# Patient Record
Sex: FEMALE | Race: WHITE | Employment: OTHER | URBAN - METROPOLITAN AREA
[De-identification: names, ages, dates, MRNs, and addresses within clinical notes are randomized per-mention and may not be internally consistent; named-entity substitution may affect disease eponyms.]

---

## 2017-05-09 ENCOUNTER — TRANSFERRED RECORDS (OUTPATIENT)
Dept: HEALTH INFORMATION MANAGEMENT | Facility: CLINIC | Age: 67
End: 2017-05-09

## 2017-05-10 ENCOUNTER — TRANSFERRED RECORDS (OUTPATIENT)
Dept: HEALTH INFORMATION MANAGEMENT | Facility: CLINIC | Age: 67
End: 2017-05-10

## 2017-05-15 ENCOUNTER — HOSPITAL ENCOUNTER (OUTPATIENT)
Facility: CLINIC | Age: 67
Discharge: HOME | End: 2017-05-15
Attending: RADIOLOGY | Admitting: RADIOLOGY
Payer: MEDICARE

## 2017-05-15 ENCOUNTER — APPOINTMENT (OUTPATIENT)
Dept: INTERVENTIONAL RADIOLOGY/VASCULAR | Facility: CLINIC | Age: 67
End: 2017-05-15
Attending: INTERNAL MEDICINE
Payer: MEDICARE

## 2017-05-15 VITALS
SYSTOLIC BLOOD PRESSURE: 130 MMHG | OXYGEN SATURATION: 93 % | DIASTOLIC BLOOD PRESSURE: 69 MMHG | HEART RATE: 71 BPM | TEMPERATURE: 97.3 F | RESPIRATION RATE: 14 BRPM

## 2017-05-15 DIAGNOSIS — C34.90 NON-SMALL CELL LUNG CANCER (NSCLC) (H): ICD-10-CM

## 2017-05-15 PROCEDURE — C1769 GUIDE WIRE: HCPCS

## 2017-05-15 PROCEDURE — 25000128 H RX IP 250 OP 636

## 2017-05-15 PROCEDURE — 27210904 ZZH KIT CR6

## 2017-05-15 PROCEDURE — 27210908 ZZH NEEDLE CR4

## 2017-05-15 PROCEDURE — 36561 INSERT TUNNELED CV CATH: CPT

## 2017-05-15 PROCEDURE — 99153 MOD SED SAME PHYS/QHP EA: CPT

## 2017-05-15 PROCEDURE — 27210820 ZZH WOUND GLUE CR3

## 2017-05-15 PROCEDURE — 25000125 ZZHC RX 250: Performed by: RADIOLOGY

## 2017-05-15 PROCEDURE — 76937 US GUIDE VASCULAR ACCESS: CPT

## 2017-05-15 PROCEDURE — 77001 FLUOROGUIDE FOR VEIN DEVICE: CPT

## 2017-05-15 PROCEDURE — 99152 MOD SED SAME PHYS/QHP 5/>YRS: CPT

## 2017-05-15 PROCEDURE — 25000128 H RX IP 250 OP 636: Performed by: RADIOLOGY

## 2017-05-15 PROCEDURE — C1788 PORT, INDWELLING, IMP: HCPCS

## 2017-05-15 RX ORDER — FENTANYL CITRATE 50 UG/ML
INJECTION, SOLUTION INTRAMUSCULAR; INTRAVENOUS
Status: DISCONTINUED
Start: 2017-05-15 | End: 2017-05-15 | Stop reason: HOSPADM

## 2017-05-15 RX ORDER — OXYCODONE HYDROCHLORIDE 10 MG/1
10 TABLET ORAL 4 TIMES DAILY
Status: ON HOLD | COMMUNITY
End: 2022-01-01

## 2017-05-15 RX ORDER — PROMETHAZINE HYDROCHLORIDE 25 MG/1
TABLET ORAL EVERY 6 HOURS PRN
COMMUNITY

## 2017-05-15 RX ORDER — FLUMAZENIL 0.1 MG/ML
0.2 INJECTION, SOLUTION INTRAVENOUS
Status: DISCONTINUED | OUTPATIENT
Start: 2017-05-15 | End: 2017-05-15 | Stop reason: HOSPADM

## 2017-05-15 RX ORDER — CEFAZOLIN SODIUM 2 G/100ML
2 INJECTION, SOLUTION INTRAVENOUS
Status: COMPLETED | OUTPATIENT
Start: 2017-05-15 | End: 2017-05-15

## 2017-05-15 RX ORDER — LIDOCAINE 40 MG/G
CREAM TOPICAL
Status: DISCONTINUED | OUTPATIENT
Start: 2017-05-15 | End: 2017-05-15 | Stop reason: HOSPADM

## 2017-05-15 RX ORDER — HEPARIN SODIUM 1000 [USP'U]/ML
INJECTION, SOLUTION INTRAVENOUS; SUBCUTANEOUS
Status: DISCONTINUED
Start: 2017-05-15 | End: 2017-05-15 | Stop reason: HOSPADM

## 2017-05-15 RX ORDER — CEFAZOLIN SODIUM 2 G/100ML
INJECTION, SOLUTION INTRAVENOUS
Status: DISCONTINUED
Start: 2017-05-15 | End: 2017-05-15 | Stop reason: HOSPADM

## 2017-05-15 RX ORDER — FENTANYL CITRATE 50 UG/ML
25-50 INJECTION, SOLUTION INTRAMUSCULAR; INTRAVENOUS EVERY 5 MIN PRN
Status: DISCONTINUED | OUTPATIENT
Start: 2017-05-15 | End: 2017-05-15 | Stop reason: HOSPADM

## 2017-05-15 RX ORDER — NALOXONE HYDROCHLORIDE 0.4 MG/ML
.1-.4 INJECTION, SOLUTION INTRAMUSCULAR; INTRAVENOUS; SUBCUTANEOUS
Status: DISCONTINUED | OUTPATIENT
Start: 2017-05-15 | End: 2017-05-15 | Stop reason: HOSPADM

## 2017-05-15 RX ADMIN — FENTANYL CITRATE 50 MCG: 50 INJECTION INTRAMUSCULAR; INTRAVENOUS at 09:19

## 2017-05-15 RX ADMIN — FENTANYL CITRATE 50 MCG: 50 INJECTION INTRAMUSCULAR; INTRAVENOUS at 09:12

## 2017-05-15 RX ADMIN — SODIUM CHLORIDE: 9 INJECTION, SOLUTION INTRAVENOUS at 09:22

## 2017-05-15 RX ADMIN — MIDAZOLAM HYDROCHLORIDE 1 MG: 1 INJECTION, SOLUTION INTRAMUSCULAR; INTRAVENOUS at 09:12

## 2017-05-15 RX ADMIN — LIDOCAINE HYDROCHLORIDE 12 ML: 10 INJECTION, SOLUTION EPIDURAL; INFILTRATION; INTRACAUDAL; PERINEURAL at 09:14

## 2017-05-15 RX ADMIN — CEFAZOLIN SODIUM 2 G: 2 INJECTION, SOLUTION INTRAVENOUS at 09:05

## 2017-05-15 RX ADMIN — MIDAZOLAM HYDROCHLORIDE 1 MG: 1 INJECTION, SOLUTION INTRAMUSCULAR; INTRAVENOUS at 09:19

## 2017-05-15 NOTE — IP AVS SNAPSHOT
[facility]    [address] [facility] [address]    Phone:  [phone]                                       After Visit Summary   5/15/2017    Aimee Cabrera    MRN: [id number]           After Visit Summary Signature Page     I have received my discharge instructions, and my questions have been answered. I have discussed any challenges I see with this plan with the nurse or doctor.    ..........................................................................................................................................  Patient/Patient Representative Signature      ..........................................................................................................................................  Patient Representative Print Name and Relationship to Patient    ..................................................               ................................................  Date                                            Time    ..........................................................................................................................................  Reviewed by Signature/Title    ...................................................              ..............................................  Date                                                            Time

## 2017-05-15 NOTE — IP AVS SNAPSHOT
MRN:[id number]                      After Visit Summary   5/15/2017    Aimee Cabrera    MRN: [id number]           Visit Information        Department      5/15/2017  8:11 AM [facility]          Review of your medicines      UNREVIEWED medicines. Ask your doctor about these medicines        Dose / Directions    OXYCODONE HCL PO   Indication:  Chronic Pain        Dose:  5 mg   Take 5 mg by mouth every 4 hours as needed   Refills:  0       promethazine 25 MG tablet   Commonly known as:  PHENERGAN   Indication:  Nausea and Vomiting        Take by mouth every 6 hours as needed for nausea   Refills:  0                Protect others around you: Learn how to safely use, store and throw away your medicines at www.disposemymeds.org.         Follow-ups after your visit         Care Instructions        Further instructions from your care team         Going Home after Your Port Is Inserted  _______________________________________    Patient Name: Aimee Cabrera  Today's Date: May 15, 2017    The doctor who inserted your port was:  Dr. Parsons at [facility]     Have your port site and/or neck wound checked on:  Next clinic appointment      Your port may be accessed right away. A nurse needs to flush your port every 30 days or after each use.     When you get home:    You should have an adult with you for the first 6 hours.    No driving or drinking alcohol until tomorrow. You may still have side effects from the medicine you received today (you may feel drowsy, unsteady or forgetful).     You may go back to your regular diet today.     If you take aspirin or Plavix, you may begin taking it again tomorrow. You may restart all other medicines today. Use pain medicine as directed.    Avoid heavy lifting or the overuse of your shoulder for three days.    Caring for the port site and/or neck wound:    Keep the port site clean and dry. Cover the site with plastic before taking a shower. Do  "this until the site has healed.     Keep the bandage on your port site for three days. Change it if it gets wet or dirty. After three days, you may use Band-Aids until the wound has healed.    For a neck wound, leave the tape on for three days. You may cover it with a Band-Aid for comfort.     If you have oozing or bleeding from the port site or from the cut in your neck:     Put direct pressure on the wound for 5 to 10 minutes with a gauze pad.  If you still have bleeding after 10 minutes, call your doctor.    If you are bleeding a lot and can't control it with direct pressure, call 911.    Call your doctor at  Oncology clinic if you have:    Bleeding from a wound after 10 minutes of direct pressure.    Swelling in your neck or over your port site.    Signs of infection: a fever over 100 F (37.8 C) under the tongue; the site is red, tender or draining.       Additional Information About Your Visit        [employer] Information     [employer] lets you send messages to your doctor, view your test results, renew your prescriptions, schedule appointments and more. To sign up, go to [email or web address] . Click on \"Log in\" on the left side of the screen, which will take you to the Welcome page. Then click on \"Sign up Now\" on the right side of the page.     You will be asked to enter the access code listed below, as well as some personal information. Please follow the directions to create your username and password.     Your access code is: [email or web address]  Expires: 2017  9:26 AM     Your access code will  in 90 days. If you need help or a new code, please call your [facility] clinic or [phone].        Care EveryWhere ID     This is your Care EveryWhere ID. This could be used by other organizations to access your [facility] medical records  [id number]        Your Vitals Were     Blood Pressure Pulse Temperature Respirations Pulse Oximetry       138/70 68 97.3  F (36.3  C) (Temporal) 14 94%        Primary Care " Provider Office Phone # Fax #    Elizabeth Gore [phone]      Thank you!     Thank you for choosing [facility] for your care. Our goal is always to provide you with excellent care. Hearing back from our patients is one way we can continue to improve our services. Please take a few minutes to complete the written survey that you may receive in the mail after you visit. If you would like to speak to someone directly about your visit please contact Patient Relations at [phone]. Thank you!               Medication List: This is a list of all your medications and when to take them. Check marks below indicate your daily home schedule. Keep this list as a reference.      Medications           Morning Afternoon Evening Bedtime As Needed    OXYCODONE HCL PO   Take 5 mg by mouth every 4 hours as needed                                promethazine 25 MG tablet   Commonly known as:  PHENERGAN   Take by mouth every 6 hours as needed for nausea

## 2017-05-15 NOTE — DISCHARGE INSTRUCTIONS
Going Home after Your Port Is Inserted  _______________________________________    Patient Name: Aimee Cabrera  Today's Date: May 15, 2017    The doctor who inserted your port was:  Dr. Parsons at [facility]     Have your port site and/or neck wound checked on:  Next clinic appointment      Your port may be accessed right away. A nurse needs to flush your port every 30 days or after each use.     When you get home:    You should have an adult with you for the first 6 hours.    No driving or drinking alcohol until tomorrow. You may still have side effects from the medicine you received today (you may feel drowsy, unsteady or forgetful).     You may go back to your regular diet today.     If you take aspirin or Plavix, you may begin taking it again tomorrow. You may restart all other medicines today. Use pain medicine as directed.    Avoid heavy lifting or the overuse of your shoulder for three days.    Caring for the port site and/or neck wound:    Keep the port site clean and dry. Cover the site with plastic before taking a shower. Do this until the site has healed.     Keep the bandage on your port site for three days. Change it if it gets wet or dirty. After three days, you may use Band-Aids until the wound has healed.    For a neck wound, leave the tape on for three days. You may cover it with a Band-Aid for comfort.     If you have oozing or bleeding from the port site or from the cut in your neck:     Put direct pressure on the wound for 5 to 10 minutes with a gauze pad.  If you still have bleeding after 10 minutes, call your doctor.    If you are bleeding a lot and can't control it with direct pressure, call 911.    Call your doctor at  Oncology clinic if you have:    Bleeding from a wound after 10 minutes of direct pressure.    Swelling in your neck or over your port site.    Signs of infection: a fever over 100 F (37.8 C) under the tongue; the site is red, tender or draining.

## 2022-01-01 ENCOUNTER — HOSPITAL ENCOUNTER (OUTPATIENT)
Facility: CLINIC | Age: 72
Setting detail: OBSERVATION
Discharge: NURSING, REHAB OR LONG-TERM CARE | End: 2022-03-06
Attending: HOSPITALIST | Admitting: HOSPITALIST
Payer: COMMERCIAL

## 2022-01-01 ENCOUNTER — TELEPHONE (OUTPATIENT)
Dept: FAMILY MEDICINE | Facility: CLINIC | Age: 72
End: 2022-01-01
Payer: COMMERCIAL

## 2022-01-01 ENCOUNTER — HOSPITAL ENCOUNTER (OUTPATIENT)
Dept: CARDIOLOGY | Facility: CLINIC | Age: 72
Discharge: HOME | End: 2022-03-08
Attending: INTERNAL MEDICINE | Admitting: INTERNAL MEDICINE
Payer: COMMERCIAL

## 2022-01-01 ENCOUNTER — TRANSFERRED RECORDS (OUTPATIENT)
Dept: HEALTH INFORMATION MANAGEMENT | Facility: CLINIC | Age: 72
End: 2022-01-01
Payer: COMMERCIAL

## 2022-01-01 ENCOUNTER — TRANSFERRED RECORDS (OUTPATIENT)
Dept: HEALTH INFORMATION MANAGEMENT | Facility: CLINIC | Age: 72
End: 2022-01-01

## 2022-01-01 VITALS
TEMPERATURE: 98.1 F | HEIGHT: 69 IN | DIASTOLIC BLOOD PRESSURE: 72 MMHG | BODY MASS INDEX: 17.52 KG/M2 | HEART RATE: 86 BPM | WEIGHT: 118.3 LBS | OXYGEN SATURATION: 95 % | SYSTOLIC BLOOD PRESSURE: 129 MMHG | RESPIRATION RATE: 16 BRPM

## 2022-01-01 DIAGNOSIS — R10.9 FLANK PAIN: Primary | ICD-10-CM

## 2022-01-01 DIAGNOSIS — R11.2 NAUSEA AND VOMITING, INTRACTABILITY OF VOMITING NOT SPECIFIED, UNSPECIFIED VOMITING TYPE: ICD-10-CM

## 2022-01-01 DIAGNOSIS — C34.90 NON-SMALL CELL LUNG CANCER (H): Primary | ICD-10-CM

## 2022-01-01 DIAGNOSIS — C34.90 NON-SMALL CELL LUNG CANCER (H): ICD-10-CM

## 2022-01-01 DIAGNOSIS — R63.6 UNDERWEIGHT: ICD-10-CM

## 2022-01-01 LAB
ANION GAP SERPL CALCULATED.3IONS-SCNC: 6 MMOL/L (ref 3–14)
ANION GAP SERPL CALCULATED.3IONS-SCNC: 6 MMOL/L (ref 3–14)
ATRIAL RATE - MUSE: 96 BPM
BASOPHILS # BLD AUTO: 0.1 10E3/UL (ref 0–0.2)
BASOPHILS NFR BLD AUTO: 1 %
BUN SERPL-MCNC: 10 MG/DL (ref 7–30)
BUN SERPL-MCNC: 16 MG/DL (ref 7–30)
CALCIUM SERPL-MCNC: 8.1 MG/DL (ref 8.5–10.1)
CALCIUM SERPL-MCNC: 8.6 MG/DL (ref 8.5–10.1)
CHLORIDE BLD-SCNC: 103 MMOL/L (ref 94–109)
CHLORIDE BLD-SCNC: 106 MMOL/L (ref 94–109)
CO2 SERPL-SCNC: 26 MMOL/L (ref 20–32)
CO2 SERPL-SCNC: 27 MMOL/L (ref 20–32)
CREAT SERPL-MCNC: 0.58 MG/DL (ref 0.52–1.04)
CREAT SERPL-MCNC: 0.63 MG/DL (ref 0.52–1.04)
CREATININE (EXTERNAL): 0.86 MG/DL (ref 0.5–1.2)
DIASTOLIC BLOOD PRESSURE - MUSE: NORMAL MMHG
EOSINOPHIL # BLD AUTO: 0.4 10E3/UL (ref 0–0.7)
EOSINOPHIL NFR BLD AUTO: 6 %
ERYTHROCYTE [DISTWIDTH] IN BLOOD BY AUTOMATED COUNT: 14 % (ref 10–15)
ERYTHROCYTE [DISTWIDTH] IN BLOOD BY AUTOMATED COUNT: 14.5 % (ref 10–15)
GFR ESTIMATED (EXTERNAL): 72.2 ML/MIN/1.73M2
GFR SERPL CREATININE-BSD FRML MDRD: >90 ML/MIN/1.73M2
GFR SERPL CREATININE-BSD FRML MDRD: >90 ML/MIN/1.73M2
GLUCOSE (EXTERNAL): 107 MG/DL (ref 73–126)
GLUCOSE BLD-MCNC: 86 MG/DL (ref 70–99)
GLUCOSE BLD-MCNC: 88 MG/DL (ref 70–99)
HCT VFR BLD AUTO: 36 % (ref 35–47)
HCT VFR BLD AUTO: 37.6 % (ref 35–47)
HGB BLD-MCNC: 11.4 G/DL (ref 11.7–15.7)
HGB BLD-MCNC: 12.3 G/DL (ref 11.7–15.7)
IMM GRANULOCYTES # BLD: 0 10E3/UL
IMM GRANULOCYTES NFR BLD: 0 %
INTERPRETATION ECG - MUSE: NORMAL
LYMPHOCYTES # BLD AUTO: 2.2 10E3/UL (ref 0.8–5.3)
LYMPHOCYTES NFR BLD AUTO: 28 %
MCH RBC QN AUTO: 29.2 PG (ref 26.5–33)
MCH RBC QN AUTO: 29.9 PG (ref 26.5–33)
MCHC RBC AUTO-ENTMCNC: 31.7 G/DL (ref 31.5–36.5)
MCHC RBC AUTO-ENTMCNC: 32.7 G/DL (ref 31.5–36.5)
MCV RBC AUTO: 92 FL (ref 78–100)
MCV RBC AUTO: 92 FL (ref 78–100)
MONOCYTES # BLD AUTO: 0.8 10E3/UL (ref 0–1.3)
MONOCYTES NFR BLD AUTO: 11 %
NEUTROPHILS # BLD AUTO: 4.3 10E3/UL (ref 1.6–8.3)
NEUTROPHILS NFR BLD AUTO: 54 %
NRBC # BLD AUTO: 0 10E3/UL
NRBC BLD AUTO-RTO: 0 /100
P AXIS - MUSE: 85 DEGREES
PLATELET # BLD AUTO: 241 10E3/UL (ref 150–450)
PLATELET # BLD AUTO: 242 10E3/UL (ref 150–450)
POTASSIUM (EXTERNAL): 4 MMOL/L (ref 3.5–5.1)
POTASSIUM BLD-SCNC: 3.8 MMOL/L (ref 3.4–5.3)
POTASSIUM BLD-SCNC: 4.5 MMOL/L (ref 3.4–5.3)
PR INTERVAL - MUSE: 142 MS
QRS DURATION - MUSE: 82 MS
QT - MUSE: 368 MS
QTC - MUSE: 464 MS
R AXIS - MUSE: 78 DEGREES
RBC # BLD AUTO: 3.91 10E6/UL (ref 3.8–5.2)
RBC # BLD AUTO: 4.11 10E6/UL (ref 3.8–5.2)
SARS-COV-2 RNA RESP QL NAA+PROBE: NEGATIVE
SODIUM SERPL-SCNC: 136 MMOL/L (ref 133–144)
SODIUM SERPL-SCNC: 138 MMOL/L (ref 133–144)
SYSTOLIC BLOOD PRESSURE - MUSE: NORMAL MMHG
T AXIS - MUSE: 80 DEGREES
VENTRICULAR RATE- MUSE: 96 BPM
WBC # BLD AUTO: 7.8 10E3/UL (ref 4–11)
WBC # BLD AUTO: 7.9 10E3/UL (ref 4–11)

## 2022-01-01 PROCEDURE — 80048 BASIC METABOLIC PNL TOTAL CA: CPT | Performed by: INTERNAL MEDICINE

## 2022-01-01 PROCEDURE — 99207 PR CDG-HISTORY COMP: MEETS EXP. PROB FOCUSED- DOWN CODED LACK OF PFSH: CPT | Performed by: PHYSICIAN ASSISTANT

## 2022-01-01 PROCEDURE — 87635 SARS-COV-2 COVID-19 AMP PRB: CPT | Performed by: PHYSICIAN ASSISTANT

## 2022-01-01 PROCEDURE — 250N000011 HC RX IP 250 OP 636: Performed by: PHYSICIAN ASSISTANT

## 2022-01-01 PROCEDURE — 96376 TX/PRO/DX INJ SAME DRUG ADON: CPT

## 2022-01-01 PROCEDURE — 99218 PR INITIAL OBSERVATION CARE,LEVEL I: CPT | Performed by: PHYSICIAN ASSISTANT

## 2022-01-01 PROCEDURE — 250N000009 HC RX 250: Performed by: PHYSICIAN ASSISTANT

## 2022-01-01 PROCEDURE — 93005 ELECTROCARDIOGRAM TRACING: CPT

## 2022-01-01 PROCEDURE — 250N000013 HC RX MED GY IP 250 OP 250 PS 637: Performed by: INTERNAL MEDICINE

## 2022-01-01 PROCEDURE — 80048 BASIC METABOLIC PNL TOTAL CA: CPT | Performed by: PHYSICIAN ASSISTANT

## 2022-01-01 PROCEDURE — 99225 PR SUBSEQUENT OBSERVATION CARE,LEVEL II: CPT | Performed by: INTERNAL MEDICINE

## 2022-01-01 PROCEDURE — 99207 PR CDG-CODE CATEGORY CHANGED: CPT | Performed by: INTERNAL MEDICINE

## 2022-01-01 PROCEDURE — 96361 HYDRATE IV INFUSION ADD-ON: CPT

## 2022-01-01 PROCEDURE — 85025 COMPLETE CBC W/AUTO DIFF WBC: CPT | Performed by: PHYSICIAN ASSISTANT

## 2022-01-01 PROCEDURE — G0378 HOSPITAL OBSERVATION PER HR: HCPCS

## 2022-01-01 PROCEDURE — 96375 TX/PRO/DX INJ NEW DRUG ADDON: CPT

## 2022-01-01 PROCEDURE — 85027 COMPLETE CBC AUTOMATED: CPT | Performed by: INTERNAL MEDICINE

## 2022-01-01 PROCEDURE — 96374 THER/PROPH/DIAG INJ IV PUSH: CPT

## 2022-01-01 PROCEDURE — 250N000011 HC RX IP 250 OP 636: Performed by: INTERNAL MEDICINE

## 2022-01-01 PROCEDURE — 250N000013 HC RX MED GY IP 250 OP 250 PS 637: Performed by: PHYSICIAN ASSISTANT

## 2022-01-01 PROCEDURE — 99217 PR OBSERVATION CARE DISCHARGE: CPT | Performed by: INTERNAL MEDICINE

## 2022-01-01 PROCEDURE — 258N000003 HC RX IP 258 OP 636: Performed by: PHYSICIAN ASSISTANT

## 2022-01-01 RX ORDER — NALOXONE HYDROCHLORIDE 0.4 MG/ML
0.2 INJECTION, SOLUTION INTRAMUSCULAR; INTRAVENOUS; SUBCUTANEOUS
Status: DISCONTINUED | OUTPATIENT
Start: 2022-01-01 | End: 2022-01-01 | Stop reason: HOSPADM

## 2022-01-01 RX ORDER — PROMETHAZINE HYDROCHLORIDE 25 MG/1
25 TABLET ORAL EVERY 6 HOURS PRN
Status: DISCONTINUED | OUTPATIENT
Start: 2022-01-01 | End: 2022-01-01 | Stop reason: HOSPADM

## 2022-01-01 RX ORDER — ESZOPICLONE 1 MG/1
2 TABLET, FILM COATED ORAL AT BEDTIME
Status: DISCONTINUED | OUTPATIENT
Start: 2022-01-01 | End: 2022-01-01 | Stop reason: HOSPADM

## 2022-01-01 RX ORDER — HYDROMORPHONE HCL IN WATER/PF 6 MG/30 ML
0.2 PATIENT CONTROLLED ANALGESIA SYRINGE INTRAVENOUS
Status: DISCONTINUED | OUTPATIENT
Start: 2022-01-01 | End: 2022-01-01 | Stop reason: HOSPADM

## 2022-01-01 RX ORDER — HYDROXYZINE HYDROCHLORIDE 50 MG/1
50 TABLET, FILM COATED ORAL EVERY 6 HOURS PRN
Status: DISCONTINUED | OUTPATIENT
Start: 2022-01-01 | End: 2022-01-01 | Stop reason: HOSPADM

## 2022-01-01 RX ORDER — OXYCODONE HYDROCHLORIDE 5 MG/1
10 TABLET ORAL EVERY 4 HOURS PRN
Status: DISCONTINUED | OUTPATIENT
Start: 2022-01-01 | End: 2022-01-01

## 2022-01-01 RX ORDER — HYDROMORPHONE HYDROCHLORIDE 4 MG/1
4 TABLET ORAL
Qty: 24 TABLET | Refills: 0 | Status: SHIPPED | OUTPATIENT
Start: 2022-01-01

## 2022-01-01 RX ORDER — AMOXICILLIN 250 MG
2 CAPSULE ORAL 2 TIMES DAILY PRN
Status: DISCONTINUED | OUTPATIENT
Start: 2022-01-01 | End: 2022-01-01 | Stop reason: HOSPADM

## 2022-01-01 RX ORDER — ACETAMINOPHEN 650 MG/1
650 SUPPOSITORY RECTAL EVERY 6 HOURS PRN
Status: DISCONTINUED | OUTPATIENT
Start: 2022-01-01 | End: 2022-01-01 | Stop reason: HOSPADM

## 2022-01-01 RX ORDER — HYDROXYZINE HYDROCHLORIDE 25 MG/1
25 TABLET, FILM COATED ORAL EVERY 6 HOURS PRN
Status: DISCONTINUED | OUTPATIENT
Start: 2022-01-01 | End: 2022-01-01 | Stop reason: HOSPADM

## 2022-01-01 RX ORDER — MIRTAZAPINE 15 MG/1
15 TABLET, FILM COATED ORAL AT BEDTIME
Status: DISCONTINUED | OUTPATIENT
Start: 2022-01-01 | End: 2022-01-01 | Stop reason: HOSPADM

## 2022-01-01 RX ORDER — MIRTAZAPINE 15 MG/1
15 TABLET, FILM COATED ORAL AT BEDTIME
Qty: 30 TABLET | Refills: 0 | Status: SHIPPED | OUTPATIENT
Start: 2022-01-01 | End: 2022-01-01

## 2022-01-01 RX ORDER — HEPARIN SODIUM,PORCINE 10 UNIT/ML
5-10 VIAL (ML) INTRAVENOUS EVERY 24 HOURS
Status: DISCONTINUED | OUTPATIENT
Start: 2022-01-01 | End: 2022-01-01 | Stop reason: HOSPADM

## 2022-01-01 RX ORDER — POLYETHYLENE GLYCOL 3350 17 G/17G
17 POWDER, FOR SOLUTION ORAL DAILY PRN
Status: DISCONTINUED | OUTPATIENT
Start: 2022-01-01 | End: 2022-01-01 | Stop reason: HOSPADM

## 2022-01-01 RX ORDER — ESZOPICLONE 2 MG/1
2 TABLET, FILM COATED ORAL AT BEDTIME
Qty: 30 TABLET | Refills: 0 | Status: SHIPPED | OUTPATIENT
Start: 2022-01-01

## 2022-01-01 RX ORDER — SCOLOPAMINE TRANSDERMAL SYSTEM 1 MG/1
1 PATCH, EXTENDED RELEASE TRANSDERMAL
Status: DISCONTINUED | OUTPATIENT
Start: 2022-01-01 | End: 2022-01-01 | Stop reason: HOSPADM

## 2022-01-01 RX ORDER — NALOXONE HYDROCHLORIDE 0.4 MG/ML
0.4 INJECTION, SOLUTION INTRAMUSCULAR; INTRAVENOUS; SUBCUTANEOUS
Status: DISCONTINUED | OUTPATIENT
Start: 2022-01-01 | End: 2022-01-01 | Stop reason: HOSPADM

## 2022-01-01 RX ORDER — LIDOCAINE 40 MG/G
CREAM TOPICAL
Status: DISCONTINUED | OUTPATIENT
Start: 2022-01-01 | End: 2022-01-01 | Stop reason: HOSPADM

## 2022-01-01 RX ORDER — LORAZEPAM 2 MG/ML
.5-1 INJECTION INTRAMUSCULAR EVERY 4 HOURS PRN
Status: DISCONTINUED | OUTPATIENT
Start: 2022-01-01 | End: 2022-01-01 | Stop reason: HOSPADM

## 2022-01-01 RX ORDER — HEPARIN SODIUM (PORCINE) LOCK FLUSH IV SOLN 100 UNIT/ML 100 UNIT/ML
5-10 SOLUTION INTRAVENOUS
Status: DISCONTINUED | OUTPATIENT
Start: 2022-01-01 | End: 2022-01-01 | Stop reason: HOSPADM

## 2022-01-01 RX ORDER — MIRTAZAPINE 15 MG/1
15 TABLET, FILM COATED ORAL AT BEDTIME
Qty: 30 TABLET | Refills: 0 | Status: SHIPPED | OUTPATIENT
Start: 2022-01-01

## 2022-01-01 RX ORDER — OXYCODONE HYDROCHLORIDE 5 MG/1
5 TABLET ORAL EVERY 4 HOURS PRN
Status: DISCONTINUED | OUTPATIENT
Start: 2022-01-01 | End: 2022-01-01

## 2022-01-01 RX ORDER — HYDROMORPHONE HYDROCHLORIDE 2 MG/1
4 TABLET ORAL
Status: DISCONTINUED | OUTPATIENT
Start: 2022-01-01 | End: 2022-01-01 | Stop reason: HOSPADM

## 2022-01-01 RX ORDER — SODIUM CHLORIDE 9 MG/ML
INJECTION, SOLUTION INTRAVENOUS CONTINUOUS
Status: ACTIVE | OUTPATIENT
Start: 2022-01-01 | End: 2022-01-01

## 2022-01-01 RX ORDER — HYDROXYZINE HYDROCHLORIDE 25 MG/1
25 TABLET, FILM COATED ORAL EVERY 6 HOURS PRN
Qty: 30 TABLET | Refills: 0 | Status: SHIPPED | OUTPATIENT
Start: 2022-01-01 | End: 2022-01-01

## 2022-01-01 RX ORDER — SCOLOPAMINE TRANSDERMAL SYSTEM 1 MG/1
1 PATCH, EXTENDED RELEASE TRANSDERMAL
Qty: 3 PATCH | Refills: 0 | Status: SHIPPED | OUTPATIENT
Start: 2022-01-01

## 2022-01-01 RX ORDER — ESZOPICLONE 3 MG/1
3 TABLET, FILM COATED ORAL AT BEDTIME
Status: ON HOLD | COMMUNITY
End: 2022-01-01

## 2022-01-01 RX ORDER — SCOLOPAMINE TRANSDERMAL SYSTEM 1 MG/1
1 PATCH, EXTENDED RELEASE TRANSDERMAL
Qty: 3 PATCH | Refills: 0 | Status: SHIPPED | OUTPATIENT
Start: 2022-01-01 | End: 2022-01-01

## 2022-01-01 RX ORDER — AMOXICILLIN 250 MG
1 CAPSULE ORAL 2 TIMES DAILY PRN
Status: DISCONTINUED | OUTPATIENT
Start: 2022-01-01 | End: 2022-01-01 | Stop reason: HOSPADM

## 2022-01-01 RX ORDER — ACETAMINOPHEN 325 MG/1
650 TABLET ORAL EVERY 6 HOURS PRN
Status: DISCONTINUED | OUTPATIENT
Start: 2022-01-01 | End: 2022-01-01 | Stop reason: HOSPADM

## 2022-01-01 RX ORDER — HEPARIN SODIUM,PORCINE 10 UNIT/ML
5-10 VIAL (ML) INTRAVENOUS
Status: DISCONTINUED | OUTPATIENT
Start: 2022-01-01 | End: 2022-01-01 | Stop reason: HOSPADM

## 2022-01-01 RX ORDER — HYDROXYZINE HYDROCHLORIDE 25 MG/1
25 TABLET, FILM COATED ORAL EVERY 6 HOURS PRN
Qty: 30 TABLET | Refills: 0 | Status: SHIPPED | OUTPATIENT
Start: 2022-01-01

## 2022-01-01 RX ADMIN — HYDROMORPHONE HYDROCHLORIDE 4 MG: 2 TABLET ORAL at 11:55

## 2022-01-01 RX ADMIN — SERTRALINE HYDROCHLORIDE 50 MG: 50 TABLET ORAL at 08:04

## 2022-01-01 RX ADMIN — SODIUM CHLORIDE: 9 INJECTION, SOLUTION INTRAVENOUS at 15:50

## 2022-01-01 RX ADMIN — HYDROMORPHONE HYDROCHLORIDE 0.2 MG: 0.2 INJECTION, SOLUTION INTRAMUSCULAR; INTRAVENOUS; SUBCUTANEOUS at 19:37

## 2022-01-01 RX ADMIN — SCOPALAMINE 1 PATCH: 1 PATCH, EXTENDED RELEASE TRANSDERMAL at 15:57

## 2022-01-01 RX ADMIN — LORAZEPAM 0.5 MG: 2 INJECTION INTRAMUSCULAR; INTRAVENOUS at 15:57

## 2022-01-01 RX ADMIN — HYDROMORPHONE HYDROCHLORIDE 4 MG: 2 TABLET ORAL at 18:07

## 2022-01-01 RX ADMIN — SERTRALINE HYDROCHLORIDE 50 MG: 50 TABLET ORAL at 09:45

## 2022-01-01 RX ADMIN — HYDROMORPHONE HYDROCHLORIDE 0.2 MG: 0.2 INJECTION, SOLUTION INTRAMUSCULAR; INTRAVENOUS; SUBCUTANEOUS at 22:43

## 2022-01-01 RX ADMIN — HEPARIN SODIUM (PORCINE) LOCK FLUSH IV SOLN 100 UNIT/ML 5 ML: 100 SOLUTION at 10:45

## 2022-01-01 RX ADMIN — ESZOPICLONE 2 MG: 1 TABLET, FILM COATED ORAL at 21:39

## 2022-01-01 RX ADMIN — LORAZEPAM 0.5 MG: 2 INJECTION INTRAMUSCULAR; INTRAVENOUS at 12:02

## 2022-01-01 RX ADMIN — HYDROMORPHONE HYDROCHLORIDE 4 MG: 2 TABLET ORAL at 21:42

## 2022-01-01 RX ADMIN — HYDROXYZINE HYDROCHLORIDE 25 MG: 25 TABLET, FILM COATED ORAL at 20:24

## 2022-01-01 RX ADMIN — HYDROMORPHONE HYDROCHLORIDE 0.2 MG: 0.2 INJECTION, SOLUTION INTRAMUSCULAR; INTRAVENOUS; SUBCUTANEOUS at 02:01

## 2022-01-01 RX ADMIN — HYDROMORPHONE HYDROCHLORIDE 4 MG: 2 TABLET ORAL at 08:04

## 2022-01-01 RX ADMIN — HYDROMORPHONE HYDROCHLORIDE 0.2 MG: 0.2 INJECTION, SOLUTION INTRAMUSCULAR; INTRAVENOUS; SUBCUTANEOUS at 05:06

## 2022-01-01 RX ADMIN — HYDROMORPHONE HYDROCHLORIDE 0.2 MG: 0.2 INJECTION, SOLUTION INTRAMUSCULAR; INTRAVENOUS; SUBCUTANEOUS at 08:15

## 2022-01-01 RX ADMIN — HYDROMORPHONE HYDROCHLORIDE 4 MG: 2 TABLET ORAL at 15:23

## 2022-01-01 RX ADMIN — HYDROMORPHONE HYDROCHLORIDE 0.2 MG: 0.2 INJECTION, SOLUTION INTRAMUSCULAR; INTRAVENOUS; SUBCUTANEOUS at 15:50

## 2022-03-04 PROBLEM — R10.9 FLANK PAIN: Status: ACTIVE | Noted: 2022-01-01

## 2022-03-04 PROBLEM — R11.2 NAUSEA WITH VOMITING: Status: ACTIVE | Noted: 2022-01-01

## 2022-03-04 NOTE — TELEPHONE ENCOUNTER
3-Hospitalist Huddle Documentation    Acuity/Preferred Bed Type: medical floor  Infection Concerns: none  Additional Specialist Needed Or Specialist Already Contacted:   Oncology  Timely Treatments Needed: No  Important things to know/address during hospitalization: sitter not needed and none      Clinic: [facility]  Provider: Dr. Massey  Dx: Lymphoma      Significant pain in left flank from adrenal metastasis and recent biopsy. Appears very weak, nauseas and not keeping anything down.     Requesting fluids and pain control.     No fever, mild tachycardia,   Counts look OK per oncology    Last CT scan on 2/14 with mets.   Consider reimaging and consult oncology

## 2022-03-04 NOTE — PHARMACY-ADMISSION MEDICATION HISTORY
.Admission medication history interview status for this patient is complete. See [facility] admission navigator for allergy information, prior to admission medications and immunization status.     Medication history interview done, indicate source(s): Patient  Medication history resources (including written lists, pill bottles, clinic record):None  Pharmacy: [id number]  [place] - [address]    Changes made to PTA medication list:  Added: Lunesta 3mg, Zoloft 50mg  Changed: Oxycodone 5mg -> 10mg QID  Reported as Not Taking: none  Removed: none    Actions taken by pharmacist (provider contacted, etc): Called [facility] for doses     Additional medication history information:None    Medication reconciliation/reorder completed by provider prior to medication history?  No       Prior to Admission medications    Medication Sig Last Dose Taking? Auth Provider   eszopiclone (LUNESTA) 3 MG tablet Take 3 mg by mouth At Bedtime 3/2/2022 Yes Unknown, Entered By History   oxyCODONE IR (ROXICODONE) 10 MG tablet Take 10 mg by mouth 4 times daily  3/2/2022 Yes Reported, Patient   promethazine (PHENERGAN) 25 MG tablet Take by mouth every 6 hours as needed for nausea 3/2/2022 Yes Reported, Patient   sertraline (ZOLOFT) 50 MG tablet Take 50 mg by mouth daily 3/2/2022 Yes Unknown, Entered By History

## 2022-03-04 NOTE — H&P
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     1. LUQ abd pain, N/V  Hx of stage IV non small cell lung ca, treated with chemo radiation ~5 yrs ago. Hx is a bit unclear, was started on Keytruda ~1 yr ago due to, I believe, enlarging adrenal mass. Again, without records, exact time line is unclear. For the past month, has had increasing LUQ abd/flank pain with nausea and vomiting and poor appetite. Denies fever, chills, cough, chest pain, SOB or diarrhea. S/p biopsy of the left adrenal gland a few days ago. Dr. Massey from [facility] requested direct admit for pain control and symptom management.  -admit to OBS  -will request records as there is no recent data in our chart  -consult oncology  -IVFs  -antiemetics - phenergan PRN and scopolamine patch and PRN IV ativan  -pain control with Tylenol, oxycodone, hydroxyzine and IV dilaudid  -planning for immunotherapy and maybe Keytruda once functional status improves  -hold off on further imaging or labs today until records are obtained.     Asymptomatic Covid test       Diet:   regular diet as tolerated  DVT Prophylaxis: Pneumatic Compression Devices  Segovia Catheter: Not present  Central Lines: PRESENT     Cardiac Monitoring: None  Code Status:   full code    Clinically Significant Risk Factors Present on Admission                     Disposition Plan   Expected Discharge:  1-2 days   Anticipated discharge location:  Awaiting care coordination huddle  Delays:     none         The patient's care was discussed with the Attending Physician, Dr. Al, Bedside Nurse and Patient.    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 She was dx with stage IV non small cell lung ca ~5 yrs ago. She was treated with chemo and radiation. She had, I believe, an adrenal mass at the time of her dx. She was started on Keytruda maybe 1 yr ago and things had remained stable until recently. Her adrenal mass has been enlarging. She has increased LUQ abd/flank pain for the past several months, as well as nausea, vomiting and poor appetite. She did have a biopsy a few days ago but denies significant change in sx since that time. She had follow up with Dr. Massey in clinic today who requested direct admit for symptom management. She denies fever, chills, cough, chest pain, SOB, diarrhea or dysuria. Per pt, she is supposed to start immunotherapy plus Ketruda once her functional status improves.     Review of Systems    The 10 point Review of Systems is negative other than noted in the HPI or here.     Past Medical History    I have reviewed this patient's medical history and updated it with pertinent information if needed.   Non small cell lung ca    Past Surgical History   I have reviewed this patient's surgical history and updated it with pertinent information if needed.  SHANNON, BSO    Social History   I have reviewed this patient's social history and updated it with pertinent information if needed.  Social History     Tobacco Use     Smoking status: Not on file     Smokeless tobacco: Not on file   Substance Use Topics     Alcohol use: Not on file      Drug use: Not on file   hx of tobacco use    Family History     Mother - lymphoma, CAD    Prior to Admission Medications   Prior to Admission Medications   Prescriptions Last Dose Informant Patient Reported? Taking?   OXYCODONE HCL PO   Yes No   Sig: Take 5 mg by mouth every 4 hours as needed   promethazine (PHENERGAN) 25 MG tablet   Yes No   Sig: Take by mouth every 6 hours as needed for nausea      Facility-Administered Medications: None     Allergies   Allergies   Allergen Reactions     Zofran [Ondansetron] Other (See Comments)       Physical Exam   Vital Signs: Temp: 97.8  F (36.6  C) Temp src: Oral BP: (!) 154/78 Pulse: 91   Resp: 16 SpO2: 99 % O2 Device: None (Room air)    Weight: 118 lbs 4.8 oz    GENERAL:  Comfortable.  PSYCH: pleasant, oriented, No acute distress.  HEENT:  Atraumatic, normocephalic. Normal conjunctiva, normal hearing, and oropharynx is normal.  NECK:  Supple, no neck vein distention  HEART:  Normal S1, S2 with no murmur, no pericardial rub, gallops or S3 or S4.  LUNGS:  Clear to auscultation, normal Respiratory effort. No wheezing, rales or ronchi.  GI:  Soft, normal bowel sounds. LUQ abd tenderness, non distended.   EXTREMITIES:  No pedal edema, +2 pulses bilateral and equal.  SKIN:  Dry to touch, No rash, wound or ulcerations.  NEUROLOGIC:  CN 2-12 intact, BL 5/5 symmetric upper and lower extremity strength, sensation is intact with no focal deficits.      Data   Data reviewed today: I reviewed all medications, new labs and imaging results over the last 24 hours. I personally reviewed no images or EKG's available for review.

## 2022-03-05 NOTE — PROGRESS NOTES
PRIMARY DIAGNOSIS: N/V, Pain  OUTPATIENT/OBSERVATION GOALS TO BE MET BEFORE DISCHARGE:  1. ADLs back to baseline: Yes    2. Activity and level of assistance: Up with standby assistance.    3. Pain status: Improved-controlled with oral pain medications.    4. Return to near baseline physical activity: Yes     Discharge Planner Nurse   Safe discharge environment identified: Yes  Barriers to discharge: Yes       Entered by: Sean Perez 03/05/2022 3:59 PM   pt up with assist, pain controlled with PO medications, pt calls appropriately, pt continues to have poor PO intake, VSS, no acute changes.   Please review provider order for any additional goals.   Nurse to notify provider when observation goals have been met and patient is ready for discharge.

## 2022-03-05 NOTE — PLAN OF CARE
"PRIMARY DIAGNOSIS: Flank Pain, N/V  OUTPATIENT/OBSERVATION GOALS TO BE MET BEFORE DISCHARGE:  1. ADLs back to baseline: Yes    2. Activity and level of assistance: Up with standby assistance.    3. Pain status: Improved but still requiring IV narcotics.    4. Return to near baseline physical activity: Yes     Discharge Planner Nurse   Safe discharge environment identified: Yes  Barriers to discharge: Yes       Entered by: Dary Garrett 03/05/2022 12:08 AM    /79 (BP Location: Right arm)   Pulse 85   Temp 98.2  F (36.8  C) (Oral)   Resp 16   Ht 1.753 m (5' 9\")   Wt 53.7 kg (118 lb 4.8 oz)   SpO2 96%   BMI 17.47 kg/m     Pt rates pain 7/10, IV dilaudid given for relief. Pt is A&O. On a regular diet, poor intake. Pt denies nausea. Has a port infusing NS at 100 mL/hr. Hematology/oncology following. Up SBA d/t medication use, but is steady on feet. Will continue to monitor and provide supportive measures.   Please review provider order for any additional goals.   Nurse to notify provider when observation goals have been met and patient is ready for discharge.  "

## 2022-03-05 NOTE — PROGRESS NOTES
PRIMARY DIAGNOSIS: N/V, Pain   OUTPATIENT/OBSERVATION GOALS TO BE MET BEFORE DISCHARGE:  1. ADLs back to baseline: Yes    2. Activity and level of assistance: Up with standby assistance.    3. Pain status: Improved-controlled with oral pain medications.    4. Return to near baseline physical activity: Yes     Discharge Planner Nurse   Safe discharge environment identified: Yes  Barriers to discharge: Yes       Entered by: Sean Perez 03/05/2022 12:28 PM      Pt given PO medications for pain, pt nauseated with small emesis, IV ativan given x1.   Please review provider order for any additional goals.   Nurse to notify provider when observation goals have been met and patient is ready for discharge.

## 2022-03-05 NOTE — PROGRESS NOTES
PRIMARY DIAGNOSIS: N/V, Pain  OUTPATIENT/OBSERVATION GOALS TO BE MET BEFORE DISCHARGE:  1. ADLs back to baseline: Yes    2. Activity and level of assistance: Up with standby assistance.    3. Pain status: Improved but still requiring IV narcotics.    4. Return to near baseline physical activity: Yes     Discharge Planner Nurse   Safe discharge environment identified: Yes  Barriers to discharge: Yes       Entered by: Sean Perez 03/05/2022 12:27 PM     Please review provider order for any additional goals.   Nurse to notify provider when observation goals have been met and patient is ready for discharge.

## 2022-03-05 NOTE — PLAN OF CARE
Goal Outcome Evaluation:        PRIMARY DIAGNOSIS: N/V    OUTPATIENT/OBSERVATION GOALS TO BE MET BEFORE DISCHARGE  1. Orthostatic performed: N/A    2. Tolerating PO fluid and/or antibiotics (if applicable): Yes    3. Nausea/Vomiting/Diarrhea symptoms improved: Yes with antiemetics    4. Pain status: Improved but still requiring IV narcotics.    5. Return to near baseline physical activity: Yes    Discharge Planner Nurse   Safe discharge environment identified: Yes  Barriers to discharge: No       Entered by: Morenita Phillips 03/04/2022 6:32 PM     Please review provider order for any additional goals.   Nurse to notify provider when observation goals have been met and patient is ready for discharge.    Pt was a direct admit at 1330 from oncology. Pt is a/o, up SBA. VSS. Pain in left flank area- controlled with dilaudid. Nausea- ativan given. Appetite is poor. NS running at 100/hr. Plan is for pain control and discharge back home.

## 2022-03-05 NOTE — PLAN OF CARE
"PRIMARY DIAGNOSIS: Flank Pain, N/V  OUTPATIENT/OBSERVATION GOALS TO BE MET BEFORE DISCHARGE:  1. ADLs back to baseline: Yes    2. Activity and level of assistance: Up with standby assistance.    3. Pain status: Improved but still requiring IV narcotics.    4. Return to near baseline physical activity: Yes     Discharge Planner Nurse   Safe discharge environment identified: Yes  Barriers to discharge: Yes       Entered by: Dary Garrett 03/05/2022 4:32 AM    /77 (BP Location: Right arm)   Pulse 95   Temp 98  F (36.7  C) (Oral)   Resp 18   Ht 1.753 m (5' 9\")   Wt 53.7 kg (118 lb 4.8 oz)   SpO2 96%   BMI 17.47 kg/m     Pt rates pain 7/10, IV dilaudid given for relief. Pt is A&O. On a regular diet, poor intake. Pt denies nausea. Has a port infusing NS at 100 mL/hr. Hematology/oncology following. Up SBA d/t medication use, but is steady on feet. Will continue to monitor and provide supportive measures.   Please review provider order for any additional goals.   Nurse to notify provider when observation goals have been met and patient is ready for discharge.  "

## 2022-03-05 NOTE — PLAN OF CARE
"PRIMARY DIAGNOSIS: Flank Pain, N/V  OUTPATIENT/OBSERVATION GOALS TO BE MET BEFORE DISCHARGE:  1. ADLs back to baseline: Yes    2. Activity and level of assistance: Up with standby assistance.    3. Pain status: Improved but still requiring IV narcotics.    4. Return to near baseline physical activity: Yes     Discharge Planner Nurse   Safe discharge environment identified: Yes  Barriers to discharge: Yes       Entered by: Dary Garrett 03/04/2022 7:51 PM    /79 (BP Location: Right arm)   Pulse 85   Temp 98.2  F (36.8  C) (Oral)   Resp 16   Ht 1.753 m (5' 9\")   Wt 53.7 kg (118 lb 4.8 oz)   SpO2 96%   BMI 17.47 kg/m     Pt rates pain 7/10, IV dilaudid given for relief. Pt is A&O. On a regular diet, poor intake. Pt denies nausea. Has a port infusing NS at 100 mL/hr. Hematology/oncology following. Up SBA d/t medication use, but is steady on feet. Will continue to monitor and provide supportive measures.   Please review provider order for any additional goals.   Nurse to notify provider when observation goals have been met and patient is ready for discharge.  "

## 2022-03-05 NOTE — PROGRESS NOTES
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  The patient had been battling chronic LUQ abdominal pain and nausea and vomiting for the past several months.  It has not changed in location but has become more intense.  She was recently found to have a mass on her adrenal glands and a biopsy was obtained.  She currently follows with Dr. Massey of oncology and has been on Keytruda infusions.  In the emergency department patient found of a temperature of 97.8  F, heart rate 91, blood pressure 154/78, respiratory rate 16, SPO2 99% on room air.  Lab work revealed a hemoglobin of 11.4.  The patient was admitted to observation for pain control and symptom control.    TODAY'S PLAN:  Pt with some persistent abdominal pain overnight.  Had some nausea this AM.  Ate dinner.  Last BM was yesterday and described as loose.  No change in location of abd pain over last few months, just more intense.  N/V over last month as well.  States oxycodone makes her nauseas.  Will change to PO dilaudid 4 mg q3h prn.  Continue ativan/compazine for nausea prn.  Resume home lunesta.  Appreciate Oncology recommendations.  Will continue symptom control today.  Anticipate discharge home in the morning.      Problem List:   Acute on Chronic Intractable LUQ Abdominal Pain, Nausea, and Vomiting  - Recent rx for oxycodone 10 mg q4h prn.  Pt states it helped the pain a bit but made her quite nauseas  - Start Hydromorphone 4 mg PO q4h prn  - Compazine prn  - Continue to titrate pain meds and antiemetics    Metastatic Non-Small Cell Lung Ca  - Recent biopsy of adrenal gland  - Follows  with Dr. Massey of Oncology  - Appreciate Oncology recommendations  - Keytruda per Oncology    Underweight  - BMI 17  - Encourage PO intake    Chronic Medical Problems:  COPD  Hx of Nicotine Dependence with Cigarettes    DVT Prophylaxis: Pneumatic Compression Devices  Code Status: Full Code  Diet: Regular Diet Adult    Segovia Catheter: Not present  Disposition: Expected discharge tomorrow to home. Goals prior to discharge include symptoms improving.   Family updated today: No     Interval History   Pt seen and examined.  Pt admits to some LUQ abd pain/flank pain.  Has some nausea as well.  Ate most of dinner last night and a small breakfast.  Last BM was yesterday and described as loose.     -Data reviewed today: I personally reviewed all new labs and imaging results over the last 24 hours.     Physical Exam   Temp: 97.8  F (36.6  C) Temp src: Oral BP: (!) 153/81 Pulse: 92   Resp: 18 SpO2: 96 % O2 Device: None (Room air)    Vitals:    03/04/22 1339   Weight: 53.7 kg (118 lb 4.8 oz)     Vital Signs with Ranges  Temp:  [97.8  F (36.6  C)-98.2  F (36.8  C)] 97.8  F (36.6  C)  Pulse:  [85-95] 92  Resp:  [16-18] 18  BP: (129-154)/(77-88) 153/81  SpO2:  [96 %-99 %] 96 %  I/O last 3 completed shifts:  In: 300 [P.O.:300]  Out: -     GENERAL: No apparent distress. Awake, alert, and fully oriented.  HEENT: Normocephalic, atraumatic. Extraocular movements intact.  CARDIOVASCULAR: Regular rate and rhythm without murmurs or rubs. No S3.  PULMONARY: Clear bilaterally.  GASTROINTESTINAL: Soft, non-tender, non-distended. Bowel sounds normoactive.   EXTREMITIES: No cyanosis or clubbing. No edema.  NEUROLOGICAL: CN 2-12 grossly intact, no focal neurological deficits.  DERMATOLOGICAL: No rash, ulcer, bruising, nor jaundice.    Medications       scopolamine  1 patch Transdermal Q72H    And     scopolamine   Transdermal Q8H     sertraline  50 mg Oral Daily     sodium chloride (PF)  3 mL Intracatheter Q8H     Data     Laboratory:  Recent  Labs   Lab 03/05/22  0612   WBC 7.9   HGB 11.4*   HCT 36.0   MCV 92        Recent Labs   Lab 03/05/22  0611      POTASSIUM 3.8   CHLORIDE 106   CO2 26   ANIONGAP 6   GLC 86   BUN 16   CR 0.63   GFRESTIMATED >90   CONCEPCION 8.6     No results for input(s): CULT in the last 168 hours.    Imaging:  No results found for this or any previous visit (from the past 24 hour(s)).      Brian Best DO  [facility] Hospitalist  [address] [place] [address]  03/05/2022

## 2022-03-06 NOTE — DISCHARGE SUMMARY
Hospitalist Discharge Summary  [facility]    Aimee Cabrera MRN# [id number]   YOB: 1950 Age: 71 year old     Date of Admission:  3/4/2022  Date of Discharge:  3/6/2022 11:30 AM  Admitting Physician:  Ta Al MD  Discharge Physician:  Brian Best DO  Discharging Service:  Hospitalist     Primary Provider: Elizabeth Gore          Discharge Diagnosis:     Acute on Chronic Intractable LUQ Abdominal Pain, Nausea, and Vomiting  - Recent rx for oxycodone 10 mg q4h prn.  Pt states it helped the pain a bit but made her quite nauseas  - Start Hydromorphone 4 mg PO q4h prn  - Compazine prn  - Continue to titrate pain meds and antiemetics     Metastatic Non-Small Cell Lung Ca  - Recent biopsy of adrenal gland  - Follows with Dr. Massey of Oncology  - Appreciate Oncology recommendations  - Keytruda per Oncology     Underweight  - BMI 17  - Encourage PO intake  - Added Mirtazipine     Chronic Medical Problems:  COPD  Hx of Nicotine Dependence with Cigarettes             Discharge Disposition:     Discharged to home           Allergies:     Allergies   Allergen Reactions     Zofran [Ondansetron] Other (See Comments)              Discharge Medications:     Discharge Medication List as of 3/6/2022  8:44 AM      START taking these medications    Details   HYDROmorphone (DILAUDID) 4 MG tablet Take 1 tablet (4 mg) by mouth every 3 hours as needed for moderate to severe pain, Disp-24 tablet, R-0, Local Print      hydrOXYzine (ATARAX) 25 MG tablet Take 1 tablet (25 mg) by mouth every 6 hours as needed for other (adjuvant pain), Disp-30 tablet, R-0, E-Prescribe      mirtazapine (REMERON) 15 MG tablet Take 1 tablet (15 mg) by mouth At Bedtime, Disp-30 tablet, R-0, E-Prescribe      naloxone (NARCAN) 4 MG/0.1ML nasal spray Spray 1 spray (4 mg) into one nostril alternating nostrils once as needed for opioid reversal every 2-3 minutes until assistance arrives, Disp-0.2 mL, R-1, E-Prescribe     "  scopolamine (TRANSDERM) 1 MG/3DAYS 72 hr patch Place 1 patch onto the skin every 72 hours, Disp-3 patch, R-0, E-Prescribe         CONTINUE these medications which have CHANGED    Details   eszopiclone (LUNESTA) 2 MG tablet Take 1 tablet (2 mg) by mouth At Bedtime, Disp-30 tablet, R-0, Local Print         CONTINUE these medications which have NOT CHANGED    Details   promethazine (PHENERGAN) 25 MG tablet Take by mouth every 6 hours as needed for nausea, Historical      sertraline (ZOLOFT) 50 MG tablet Take 50 mg by mouth daily, Historical         STOP taking these medications       oxyCODONE IR (ROXICODONE) 10 MG tablet Comments:   Reason for Stopping:                      Condition on Discharge:     Discharge condition: Fair   Discharge vitals: Blood pressure 129/72, pulse 86, temperature 98.1  F (36.7  C), temperature source Oral, resp. rate 16, height 1.753 m (5' 9\"), weight 53.7 kg (118 lb 4.8 oz), SpO2 95 %.   Code status on discharge: Full Code      BASIC PHYSICAL EXAMINATION:  GENERAL: No apparent distress.  CARDIOVASCULAR: Regular rate and rhythm without murmurs.  PULMONARY: Clear to auscultation bilaterally.   GASTROINTESTINAL: Abdomen soft, non-tender.  EXTREMITIES: No edema, pulses intact.  NEUROLOGIC: No focal deficits.            History of Illness:   See detailed admission note for full details.               Procedures excluding imaging which is summarized below:     Please see details in the electronic medical record.           Consultations:     HEMATOLOGY & ONCOLOGY IP CONSULT          Significant Results:     Results for orders placed or performed during the hospital encounter of 05/15/17   IR Chest Port Placement > 5 Yrs of Age    Narrative    PORT PLACEMENT 5/15/2017 9:40 AM    HISTORY: Neoplasm    COMPARISON: None.    DESCRIPTION OF PROCEDURE: After obtaining informed consent, the  patient was placed in a supine position on the fluoroscopy table. The  neck was prepped and draped in the usual " sterile manner.     Ultrasound was used to evaluate and document patency of the internal  jugular vein. One percent lidocaine was injected for local anesthesia.  Under sterile ultrasound guidance, a puncture was made into the  internal jugular vein. A permanent copy image was obtained for the  patient's records.    A 6 [place] peel-away sheath was placed into the vein. The chest and  neck were anesthetized with lidocaine. A 2 cm skin incision was made  in the chest. A pocket for a port was created using blunt dissection.  The pocket was washed with antibiotic-laden saline. The pocket was  packed with antibiotic-laden gauze. A tunnel for the port was created  from the pocket to the neck. The port catheter was pulled through, and  the attached port was then placed into the pocket. The port was  secured in the pocket using 2 Ethilon around the port nozzle. The  catheter was measured to appropriate length and was placed through the  peel-away sheath. The tip was positioned in the mid to high right  atrium. The pocket was closed with three 3-0 Vicryl deep interrupted  stitches and Dermabond. The neck incision site was closed with  Dermabond.    The port was accessed, aspirated, flushed with saline and heparin  locked. A dressing was applied.    A fluoroscopic image was saved to document tip of catheter in  satisfactory position.     The patient tolerated the procedure well. There were no immediate  postprocedure complications. The patient's vital signs were monitored  by radiology nursing staff under my supervision and remained stable  throughout the study.    Maximal Sterile Barrier Technique Utilized: Cap AND mask AND sterile  gown AND sterile gloves AND sterile full body drape AND hand hygiene  AND skin preparation 2% chlorhexidine for cutaneous antisepsis (or  acceptable alternative antiseptics).     Sterile Ultrasound Technique Utilized ?Sterile gel AND sterile probe  covers.    MEDICATIONS: 2 mg Versed, 100 mcg  fentanyl, 2 g Ancef    SEDATION TIME: 35 minutes    FLUOROSCOPY TIME: 0.4 minutes, one spot fluoroscopic image saved.      Impression    IMPRESSION: Power port placed as above. The port is ready to use.    TERRA DOMÍNGUEZ MD       Transthoracic Echocardiogram Results:  No results found for this or any previous visit (from the past 4320 hour(s)).             Pending Results:     Unresulted Labs Ordered in the Past 30 Days of this Admission     No orders found from 2/2/2022 to 3/5/2022.                      Discharge Instructions and Follow-Up:     Discharge instructions and follow-up:   Discharge Procedure Orders   Reason for your hospital stay   Order Comments: Acute on Chronic Abdominal Pain with Nausea, Underweight/Severe Malnutrition     Follow-up and recommended labs and tests    Order Comments: Follow up with primary care provider, Elizabeth Gore, within 7 days for hospital follow- up.  The following labs/tests are recommended: CBC, CMP.    Recommend you follow-up with Dr. Massey tomorrow 3/7/2022 regarding your recent adrenal mass biopsy and further titration of your pain medications.  Monitor for signs of too much narcotics (sedation, excessive drowsiness, breathing slowly).    You are severely malnourished and underweight.  Continue using Ensure/boost shakes daily.  You have also been started on an appetite stimulant called mirtazapine.  Take this at night as a side effect is drowsiness.     Activity   Order Comments: Your activity upon discharge: activity as tolerated     Order Specific Question Answer Comments   Is discharge order? Yes      Diet   Order Comments: Follow this diet upon discharge: Orders Placed This Encounter      Regular Diet Adult     Order Specific Question Answer Comments   Is discharge order? 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  The patient had been battling chronic LUQ abdominal pain and nausea and vomiting for the past several months.  It has not changed in location but has become more intense.  She was recently found to have a mass on her adrenal glands and a biopsy was obtained.  She currently follows with Dr. Massey of oncology and has been on Keytruda infusions.  In the emergency department patient found of a temperature of 97.8  F, heart rate 91, blood pressure 154/78, respiratory rate 16, SPO2 99% on room air.  Lab work revealed a hemoglobin of 11.4.  The patient was admitted to observation for pain control and symptom control.  The patient was switched to oral dilaudid secondary to nausea caused by oxycodone.  The patient's symptoms improved.  The patient was also underweight and reported no appetite.  She was started on Mirtazipine.  On 3/6/2022, the patient was medically stable for discharge home.     The patient was seen, examined, and counseled on this day. The hospitalization and plan of care was reviewed with the patient extensively. All questions were addressed and the patient agreed to follow-up as noted above.      Total time spent in face to face contact with the patient and coordinating discharge was:  34 Minutes    DO ELVIA Montoya Hospitalist  [address] [place] [address]  03/06/2022

## 2022-03-06 NOTE — PLAN OF CARE
"PRIMARY DIAGNOSIS: Flank Pain, N/V  OUTPATIENT/OBSERVATION GOALS TO BE MET BEFORE DISCHARGE:  1. ADLs back to baseline: Yes    2. Activity and level of assistance: Up with standby assistance.    3. Pain status: Improved-controlled with oral pain medications.    4. Return to near baseline physical activity: Yes     Discharge Planner Nurse   Safe discharge environment identified: Yes  Barriers to discharge: Yes       Entered by: Dary Garrett 03/06/2022 12:41 AM  BP (!) 146/77   Pulse 89   Temp 98.8  F (37.1  C) (Oral)   Resp 16   Ht 1.753 m (5' 9\")   Wt 53.7 kg (118 lb 4.8 oz)   SpO2 96%   BMI 17.47 kg/m     Pt is up with Ind, pain controlled with PO meds. Pt is on regular diet, but has a poor appetite. A&O x4. Port is SL. Hematology/oncology following. Denies nausea or dizziness.   Please review provider order for any additional goals.   Nurse to notify provider when observation goals have been met and patient is ready for discharge.    "

## 2022-03-06 NOTE — PLAN OF CARE
"PRIMARY DIAGNOSIS: Flank Pain, N/V  OUTPATIENT/OBSERVATION GOALS TO BE MET BEFORE DISCHARGE:  1. ADLs back to baseline: Yes    2. Activity and level of assistance: Up with standby assistance.    3. Pain status: Improved-controlled with oral pain medications.    4. Return to near baseline physical activity: Yes     Discharge Planner Nurse   Safe discharge environment identified: Yes  Barriers to discharge: Yes       Entered by: Dary Garrett 03/05/2022 8:12 PM  /67 (BP Location: Left arm)   Pulse 83   Temp 97.7  F (36.5  C) (Oral)   Resp 16   Ht 1.753 m (5' 9\")   Wt 53.7 kg (118 lb 4.8 oz)   SpO2 97%   BMI 17.47 kg/m     Pt is up with SBA, pain controlled with PO meds. Pt is on regular diet, but has a poor appetite. A&O x4. Port is SL. Hematology/oncology following.   Please review provider order for any additional goals.   Nurse to notify provider when observation goals have been met and patient is ready for discharge.    "

## 2022-03-06 NOTE — PROGRESS NOTES
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  The patient had been battling chronic LUQ abdominal pain and nausea and vomiting for the past several months.  It has not changed in location but has become more intense.  She was recently found to have a mass on her adrenal glands and a biopsy was obtained.  She currently follows with Dr. Massey of oncology and has been on Keytruda infusions.  In the emergency department patient found of a temperature of 97.8  F, heart rate 91, blood pressure 154/78, respiratory rate 16, SPO2 99% on room air.  Lab work revealed a hemoglobin of 11.4.  The patient was admitted to observation for pain control and symptom control.  The patient was switched to oral dilaudid secondary to nausea caused by oxycodone.  The patient's symptoms improved.  The patient was also underweight and reported no appetite.  She was started on Mirtazipine.  On 3/6/2022, the patient was medically stable for discharge home.     TODAY'S PLAN:  Pt slept all night.  Still with some abd pain but no nausea this AM.  Poor appetite as well.  Updated daughter Mariposa via phone.  She reports the patient lies about how much she is eating and she is very concerned about her weight.  Will start mirtazipine for appetite stimulation.  Discussed side effects of med including drowsiness.  We also discussed the importance of monitoring pt for opiate overdose including over-sedation, bradypnea, etc.  All questions answered.  Pt has appointment with Dr. Massey tomorrow.  Will discharge home today.  Daughter will pick her up around 11 AM.      Problem List:   Acute  on Chronic Intractable LUQ Abdominal Pain, Nausea, and Vomiting  - Recent rx for oxycodone 10 mg q4h prn.  Pt states it helped the pain a bit but made her quite nauseas  - Start Hydromorphone 4 mg PO q4h prn  - Compazine prn  - Continue to titrate pain meds and antiemetics     Metastatic Non-Small Cell Lung Ca  - Recent biopsy of adrenal gland  - Follows with Dr. Massey of Oncology  - Appreciate Oncology recommendations  - Keytruda per Oncology     Underweight  - BMI 17  - Encourage PO intake  - Added Mirtazipine     Chronic Medical Problems:  COPD  Hx of Nicotine Dependence with Cigarettes    DVT Prophylaxis: Pneumatic Compression Devices  Code Status: Full Code  Diet: Regular Diet Adult    Segovia Catheter: Not present  Disposition: Expected discharge today to home. Goals prior to discharge include AM labs returned.   Family updated today: Yes      Interval History   Pt seen and examined.  Pt reports that she slept all night.  Denies nausea.    -Data reviewed today: I personally reviewed all new labs and imaging results over the last 24 hours.     Physical Exam   Temp: 98.3  F (36.8  C) Temp src: Oral BP: 134/68 Pulse: 83   Resp: 14 SpO2: 96 % O2 Device: None (Room air)    Vitals:    03/04/22 1339   Weight: 53.7 kg (118 lb 4.8 oz)     Vital Signs with Ranges  Temp:  [97.7  F (36.5  C)-98.8  F (37.1  C)] 98.3  F (36.8  C)  Pulse:  [83-89] 83  Resp:  [14-16] 14  BP: (110-146)/(67-86) 134/68  SpO2:  [94 %-97 %] 96 %  I/O last 3 completed shifts:  In: 50 [P.O.:50]  Out: -     GENERAL: No apparent distress. Awake, alert, and fully oriented.  HEENT: Normocephalic, atraumatic. Extraocular movements intact.  CARDIOVASCULAR: Regular rate and rhythm without murmurs or rubs. No S3.  PULMONARY: Clear bilaterally.  GASTROINTESTINAL: Soft, non-tender, non-distended. Bowel sounds normoactive.   EXTREMITIES: No cyanosis or clubbing. No edema.  NEUROLOGICAL: CN 2-12 grossly intact, no focal neurological deficits.  DERMATOLOGICAL: No  rash, ulcer, bruising, nor jaundice.    Medications       eszopiclone  2 mg Oral At Bedtime     scopolamine  1 patch Transdermal Q72H    And     scopolamine   Transdermal Q8H     sertraline  50 mg Oral Daily     sodium chloride (PF)  3 mL Intracatheter Q8H     Data     Laboratory:  Recent Labs   Lab 03/06/22  0756 03/05/22  0612   WBC 7.8 7.9   HGB 12.3 11.4*   HCT 37.6 36.0   MCV 92 92    241     Recent Labs   Lab 03/05/22  0611      POTASSIUM 3.8   CHLORIDE 106   CO2 26   ANIONGAP 6   GLC 86   BUN 16   CR 0.63   GFRESTIMATED >90   CONCEPCION 8.6     No results for input(s): CULT in the last 168 hours.    Imaging:  No results found for this or any previous visit (from the past 24 hour(s)).      Brian Best DO  [facility] Hospitalist  [address] [place] [address]  03/06/2022

## 2022-03-06 NOTE — PROGRESS NOTES
Patient's After Visit Summary was reviewed with patient.   Patient verbalized understanding of After Visit Summary, recommended follow up and was given an opportunity to ask questions.   Discharge medications sent home with patient/family: Not applicable   Discharged with daughter    OBSERVATION patient END time: 1135

## 2022-03-06 NOTE — PLAN OF CARE
"PRIMARY DIAGNOSIS: Flank Pain, N/V  OUTPATIENT/OBSERVATION GOALS TO BE MET BEFORE DISCHARGE:  1. ADLs back to baseline: Yes    2. Activity and level of assistance: Up with standby assistance.    3. Pain status: Improved-controlled with oral pain medications.    4. Return to near baseline physical activity: Yes     Discharge Planner Nurse   Safe discharge environment identified: Yes  Barriers to discharge: Yes       Entered by: Dary Garrett 03/06/2022 4:49 AM  /75 (BP Location: Left arm)   Pulse 85   Temp 98.4  F (36.9  C) (Oral)   Resp 14   Ht 1.753 m (5' 9\")   Wt 53.7 kg (118 lb 4.8 oz)   SpO2 94%   BMI 17.47 kg/m     Pt is up with Ind, pain controlled with PO meds. Pt is on regular diet, but has a poor appetite. A&O x4. Port is SL. Hematology/oncology following. Denies nausea or dizziness.   Please review provider order for any additional goals.   Nurse to notify provider when observation goals have been met and patient is ready for discharge.    "